# Patient Record
Sex: FEMALE | Race: WHITE | NOT HISPANIC OR LATINO | Employment: UNEMPLOYED | URBAN - NONMETROPOLITAN AREA
[De-identification: names, ages, dates, MRNs, and addresses within clinical notes are randomized per-mention and may not be internally consistent; named-entity substitution may affect disease eponyms.]

---

## 2022-01-26 ENCOUNTER — OFFICE VISIT (OUTPATIENT)
Dept: OTOLARYNGOLOGY | Facility: CLINIC | Age: 27
End: 2022-01-26

## 2022-01-26 VITALS — BODY MASS INDEX: 21.64 KG/M2 | WEIGHT: 117.6 LBS | OXYGEN SATURATION: 98 % | HEIGHT: 62 IN | HEART RATE: 76 BPM

## 2022-01-26 DIAGNOSIS — H92.11 OTORRHEA OF RIGHT EAR: ICD-10-CM

## 2022-01-26 DIAGNOSIS — H72.91 PERFORATION OF RIGHT TYMPANIC MEMBRANE: Primary | ICD-10-CM

## 2022-01-26 PROCEDURE — 99204 OFFICE O/P NEW MOD 45 MIN: CPT | Performed by: OTOLARYNGOLOGY

## 2022-01-26 RX ORDER — OFLOXACIN 3 MG/ML
5 SOLUTION AURICULAR (OTIC) 2 TIMES DAILY
Qty: 10 ML | Refills: 0 | Status: SHIPPED | OUTPATIENT
Start: 2022-01-26 | End: 2022-02-25

## 2022-01-30 NOTE — PROGRESS NOTES
Subjective   Kirsten Bickerstaff is a 26 y.o. female.       History of Present Illness   Patient has reportedly had recurring otorrhea from the right ear.  Says she had a lot of ear trouble as a child but never had tubes.  Feels like her hearing is decreased.  Has been treated with oral antibiotics.  Does have a history of occupational noise exposure.    The following portions of the patient's history were reviewed and updated as appropriate: allergies, current medications, past family history, past medical history, past social history, past surgical history and problem list.     reports that she has quit smoking. Her smoking use included cigarettes. She has never used smokeless tobacco. She reports previous alcohol use. She reports that she does not use drugs.   Patient is not a tobacco user and has not been counseled for use of tobacco products      Review of Systems        Objective   Physical Exam  Ears: External ears no deformity.  Right ear canal shows some mucoid discharge medially.  There is an anterior tympanic membrane perforation with some inflammation around the perforation in the middle ear mucosa.  Ciprodex is instilled.  Left ear no discharge.  Tympanic membrane intact and clear  Nares: Boggy mucosa no discharge or purulence  Oral cavity: No masses or lesions  Pharynx: No erythema or exudate  Neck: No adenopathy or mass      Assessment/Plan   Diagnoses and all orders for this visit:    1. Perforation of right tympanic membrane (Primary)    2. Otorrhea of right ear    Other orders  -     ofloxacin (FLOXIN) 0.3 % otic solution; Administer 5 drops to the right ear 2 (Two) Times a Day.  Dispense: 10 mL; Refill: 0        Plan: Ear cleaned as described above.  Begin ofloxacin 5 drops to the right ear twice a day for 10 days.  Keep water out of the ear.  Return in 3 to 4 weeks at the [place] office with an audiogram at that time.

## 2022-02-25 ENCOUNTER — CLINICAL SUPPORT (OUTPATIENT)
Dept: AUDIOLOGY | Facility: CLINIC | Age: 27
End: 2022-02-25

## 2022-02-25 ENCOUNTER — OFFICE VISIT (OUTPATIENT)
Dept: OTOLARYNGOLOGY | Facility: CLINIC | Age: 27
End: 2022-02-25

## 2022-02-25 VITALS — HEIGHT: 62 IN | WEIGHT: 112 LBS | OXYGEN SATURATION: 97 % | BODY MASS INDEX: 20.61 KG/M2

## 2022-02-25 DIAGNOSIS — H72.91 PERFORATED EAR DRUM, RIGHT: ICD-10-CM

## 2022-02-25 DIAGNOSIS — Z86.69 HISTORY OF OTITIS MEDIA: ICD-10-CM

## 2022-02-25 DIAGNOSIS — H72.91 PERFORATION OF RIGHT TYMPANIC MEMBRANE: Primary | ICD-10-CM

## 2022-02-25 DIAGNOSIS — H90.11 CONDUCTIVE HEARING LOSS OF RIGHT EAR WITH UNRESTRICTED HEARING OF LEFT EAR: ICD-10-CM

## 2022-02-25 DIAGNOSIS — H90.11 CONDUCTIVE HEARING LOSS OF RIGHT EAR WITH UNRESTRICTED HEARING OF LEFT EAR: Primary | ICD-10-CM

## 2022-02-25 PROCEDURE — 92567 TYMPANOMETRY: CPT | Performed by: AUDIOLOGIST

## 2022-02-25 PROCEDURE — 99213 OFFICE O/P EST LOW 20 MIN: CPT | Performed by: OTOLARYNGOLOGY

## 2022-02-25 PROCEDURE — 92557 COMPREHENSIVE HEARING TEST: CPT | Performed by: AUDIOLOGIST

## 2022-02-25 NOTE — PROGRESS NOTES
STANDARD AUDIOMETRIC EVALUATION      Name:  Kirsten Bickerstaff  :  1995  Age:  26 y.o.  Date of Evaluation:  2022      HISTORY    Reason for visit:  Kirsten Bickerstaff is seen today for a hearing test at the request of Dr. Bebeto Dimas.  Patient reports she has a hole in her right ear drum, and she is not hearing as well in her right ear.  She reports history of ear infections in the past.      EVALUATION    See Audiogram    RESULTS        Otoscopy and Tympanometry 226 Hz :  Right Ear:  Otoscopy:  Clear ear canal          Tympanometry:  Large ear canal volume consistent with eardrum perforation    Left Ear:   Otoscopy:  Clear ear canal        Tympanometry:  Middle ear function within normal limits    Test technique:  Standard Audiometry     Pure Tone Audiometry:   Patient responded to pure tones at 25-65 dB for 250-8000 Hz in right ear, and at 5-25 dB for 250-8000 Hz in left ear.       Speech Audiometry:        Right Ear:  Speech Reception Threshold (SRT) was obtained at 30 dBHL                 Speech Discrimination scores were 100% in masking noise when words were presented at  70 dBHL       Left Ear:  Speech Reception Threshold (SRT) was obtained at 5 dBHL                 Speech Discrimination scores were 100% in quiet when words were presented at 45 dBHL    Reliability:   good    IMPRESSIONS:  1.  Tympanometry results are consistent with Large ear canal volume consistent with eardrum perforation in right ear, and Middle ear function within normal limits in left ear.  2.  Pure tone results are consistent with mild to moderate rising conductive hearing loss  for right ear, and hearing sensitivity within normal limits in left ear.       RECOMMENDATIONS:  Patient is seeing the Ear Nose and Throat physician immediately following this examination.  It was a pleasure seeing Kirsten Bickerstaff in Audiology today.  We would be happy to do further testing or discuss these test as  necessary.          This document has been electronically signed by Mariah Hale MS CCC-A on February 25, 2022 11:26 CST       Mariah Hale MS CCC-A  Licensed Audiologist

## 2022-02-25 NOTE — PROGRESS NOTES
Subjective Kirsten Bickerstaff is a 26 y.o. female.       History of Present Illness   Patient was seen previously with a right-sided perforation and otorrhea.  Was treated with topical ofloxacin.  Returns stating she is not having any otorrhea at this point but her hearing remains decreased.      The following portions of the patient's history were reviewed and updated as appropriate: allergies, current medications, past family history, past medical history, past social history, past surgical history and problem list.     reports that she has quit smoking. Her smoking use included cigarettes. She has never used smokeless tobacco. She reports previous alcohol use. She reports that she does not use drugs.   Patient is not a tobacco user and has not been counseled for use of tobacco products      Review of Systems        Objective   Physical Exam  Right ear shows uninfected squamous debris that is cleaned under the microscope using instrumentation.  Beyond this the perforation is now dry.  Malleus appears slightly foreshortened.  Appears to be some squamous ingrowth.  Left ear no discharge.  Tympanic membrane intact and clear  Audiogram is obtained and reviewed and shows a sizable conductive hearing loss on the right in all frequencies ranging from 20 to 55 dB.  Large volume tympanogram on the right.  Normal hearing on the left with a type a tympanogram.  Discrimination scores are 100% bilaterally.      Assessment/Plan   Diagnoses and all orders for this visit:    1. Perforation of right tympanic membrane (Primary)    2. Conductive hearing loss of right ear with unrestricted hearing of left ear      Plan: Ear cleaned as described above.  Given the amount of conductive hearing loss that is present as well as the appearance of the perforation I do not think this is something that will resolve over time.  I would recommend a consultation with Dr. Velez or one of the otologist at [place].  Keep water out of the ear.   I am happy to see her for local follow-up at the discretion of her family doctor or treating physician at [place].